# Patient Record
Sex: FEMALE | Race: WHITE | NOT HISPANIC OR LATINO | Employment: UNEMPLOYED | URBAN - METROPOLITAN AREA
[De-identification: names, ages, dates, MRNs, and addresses within clinical notes are randomized per-mention and may not be internally consistent; named-entity substitution may affect disease eponyms.]

---

## 2019-10-25 ENCOUNTER — OFFICE VISIT CONVERTED (OUTPATIENT)
Dept: ORTHOPEDIC SURGERY | Facility: CLINIC | Age: 54
End: 2019-10-25
Attending: ORTHOPAEDIC SURGERY

## 2019-11-04 ENCOUNTER — HOSPITAL ENCOUNTER (OUTPATIENT)
Dept: MRI IMAGING | Facility: HOSPITAL | Age: 54
Discharge: HOME | End: 2019-11-04
Attending: ORTHOPAEDIC SURGERY

## 2019-11-08 ENCOUNTER — OFFICE VISIT CONVERTED (OUTPATIENT)
Dept: ORTHOPEDIC SURGERY | Facility: CLINIC | Age: 54
End: 2019-11-08
Attending: ORTHOPAEDIC SURGERY

## 2019-12-12 ENCOUNTER — OFFICE VISIT CONVERTED (OUTPATIENT)
Dept: ORTHOPEDIC SURGERY | Facility: CLINIC | Age: 54
End: 2019-12-12
Attending: PHYSICIAN ASSISTANT

## 2020-01-10 ENCOUNTER — CONVERSION ENCOUNTER (OUTPATIENT)
Dept: ORTHOPEDIC SURGERY | Facility: CLINIC | Age: 55
End: 2020-01-10

## 2020-01-10 ENCOUNTER — OFFICE VISIT CONVERTED (OUTPATIENT)
Dept: ORTHOPEDIC SURGERY | Facility: CLINIC | Age: 55
End: 2020-01-10
Attending: PHYSICIAN ASSISTANT

## 2020-01-24 ENCOUNTER — HOSPITAL ENCOUNTER (OUTPATIENT)
Dept: OTHER | Facility: HOSPITAL | Age: 55
Discharge: HOME | End: 2020-01-24
Attending: PHYSICIAN ASSISTANT

## 2020-02-04 ENCOUNTER — CONVERSION ENCOUNTER (OUTPATIENT)
Dept: ORTHOPEDIC SURGERY | Facility: CLINIC | Age: 55
End: 2020-02-04

## 2020-02-04 ENCOUNTER — OFFICE VISIT CONVERTED (OUTPATIENT)
Dept: ORTHOPEDIC SURGERY | Facility: CLINIC | Age: 55
End: 2020-02-04
Attending: PHYSICIAN ASSISTANT

## 2020-02-17 ENCOUNTER — HOSPITAL ENCOUNTER (OUTPATIENT)
Dept: OTHER | Facility: HOSPITAL | Age: 55
Discharge: HOME | End: 2020-02-17

## 2020-12-18 ENCOUNTER — OFFICE VISIT CONVERTED (OUTPATIENT)
Dept: SURGERY | Facility: CLINIC | Age: 55
End: 2020-12-18
Attending: SURGERY

## 2021-01-02 ENCOUNTER — HOSPITAL ENCOUNTER (OUTPATIENT)
Dept: PREADMISSION TESTING | Facility: HOSPITAL | Age: 56
Discharge: HOME | End: 2021-01-02
Attending: SURGERY

## 2021-01-04 LAB — SARS-COV-2 RNA SPEC QL NAA+PROBE: NOT DETECTED

## 2021-01-07 ENCOUNTER — HOSPITAL ENCOUNTER (OUTPATIENT)
Dept: PERIOP | Facility: HOSPITAL | Age: 56
Setting detail: HOSPITAL OUTPATIENT SURGERY
Discharge: HOME | End: 2021-01-07
Attending: SURGERY

## 2021-01-25 ENCOUNTER — CONVERSION ENCOUNTER (OUTPATIENT)
Dept: SURGERY | Facility: CLINIC | Age: 56
End: 2021-01-25

## 2021-01-25 ENCOUNTER — OFFICE VISIT CONVERTED (OUTPATIENT)
Dept: SURGERY | Facility: CLINIC | Age: 56
End: 2021-01-25
Attending: SURGERY

## 2021-05-10 NOTE — H&P
History and Physical      Patient Name: Anyi Justice   Patient ID: [id number]   Sex: Female   YOB: 1965    Primary Care Provider: Tanya Lomas MD   Referring Provider: Danielle Snellen APRN    Visit Date: December 18, 2020    Provider: Isma Andersen MD   [place] [facility]   Location Address: [address] [place] [address]  [id number]   Location Phone: [phone]          Chief Complaint  · Outpatient History & Physical / Surgical Orders  · Cholelithiasis      History Of Present Illness     Anyi came in today for evaluation. She is a really nice lady that was referred for symptomatic gallstones. She has been having typical biliary colic episodes and recently had an ultrasound that did show stones. She is otherwise doing fine and is without complaints.       Past Medical History  Allergic rhinitis, chronic; Arthritis         Past Surgical History  Cesarian Section         Medication List  simvastatin 40 mg oral tablet         Allergy List  NO KNOWN DRUG ALLERGIES; NO KNOWN DRUG ALLERGIES       Allergies Reconciled  Family Medical History  Stroke; Diabetes, unspecified type         Social History  Alcohol Use (Current some day); .; lives with other; Recreational Drug Use (Never); Tobacco (Never); Working         Review of Systems  · Constitutional  o Denies  o : fever  · Eyes  o Denies  o : yellowish discoloration of eyes  · HENT  o Denies  o : difficulty swallowing  · Cardiovascular  o Denies  o : chest pain on exertion  · Respiratory  o Denies  o : shortness of breath  · Gastrointestinal  o Admits  o : nausea, vomiting, diarrhea, constipation  · Genitourinary  o Denies  o : abnormal color of urine  · Integument  o Denies  o : rash  · Neurologic  o Denies  o : tingling or numbness  · Musculoskeletal  o Admits  o : joint pain  · Endocrine  o Denies  o : weight gain, weight loss      Vitals  Date Time BP Position Site L\R Cuff Size HR RR TEMP (F) WT  HT   "BMI kg/m2 BSA m2 O2 Sat FR L/min FiO2 HC       12/18/2020 09:49 AM       16  231lbs 0oz 4'  9\" 49.99 2.05             Physical Examination  · Constitutional  o Appearance  o : well-nourished, well developed, alert, in no acute distress  · Head and Face  o Head  o :   § Inspection  § : atraumatic, normocephalic  · Neck  o Inspection/Palpation  o : supple, normal range of motion  · Respiratory  o Inspection of Chest  o : normal inspection  o Auscultation of Lungs  o : breath sounds normal, no distress, clear to ascultate bilaterally  · Cardiovascular  o Heart  o :   § Auscultation of Heart  § : regular rate and rhythm, no murmur, gallop or rub  · Gastrointestinal  o Abdominal Examination  o : normal bowel sounds, non-tender, soft          Assessment  · Pre-Surgical Orders     V72.84  · Calculus of bile duct without cholangitis or biliary obstruction     574.50/K80.50       Very nice lady who has symptomatic gallstones.       Plan  · Orders  o MG Pre-Op Covid-19 Screening (26755) - 574.50/K80.50 - 01/02/2021  o General Surgery Order (GENOR) - 574.50/K80.50 - 01/07/2021  · Medications  o Medications have been Reconciled  o Transition of Care or Provider Policy  · Instructions  o PLAN:  o Handouts Provided-Pre-Procedure Instructions including date and time and location of procedure.  o Surgical Facility: [facility]  o ****Surgical Orders****  o ****Patient Status****  o Outpatient  o RISK AND BENEFITS:  o Consent for surgery: Given these options, the patient has verbally expressed an understanding of the risks of surgery and finds these risks acceptable. We will proceed with surgery as soon as possible.  o Consult Anesthesia for any post-operative block, or any pain management procedure deemed necessary by the anestesiologist for adequate post-operative pain control.   o O.R. PREP: Per protocol  o SCD's preoperatively  o PLEASE SIGN PERMIT FOR: Laparoscopic possible open cholecystectomy  o Indocyanine Green- " 1.25MG IV- On Call To OR  o *___The above History and Physical Examination has been completed within 30 days of admission.  o Electronically Identified Patient Education Materials Provided Electronically     We will set her up for a laparoscopic cholecystectomy. I have described the procedure to her as well as the risks and benefits and she is agreeable to proceeding.             Electronically Signed by: Nisha Ordoñez-, -Author on December 18, 2020 12:50:53 PM  Electronically Co-signed by: Isma Andersen MD -Reviewer on December 19, 2020 09:33:36 AM

## 2021-05-14 VITALS — RESPIRATION RATE: 17 BRPM | BODY MASS INDEX: 49.84 KG/M2 | WEIGHT: 231 LBS | HEIGHT: 57 IN

## 2021-05-14 VITALS — RESPIRATION RATE: 16 BRPM | BODY MASS INDEX: 49.84 KG/M2 | WEIGHT: 231 LBS | HEIGHT: 57 IN

## 2021-05-14 NOTE — PROGRESS NOTES
"   Progress Note      Patient Name: Anyi Justice   Patient ID: [id number]   Sex: Female   YOB: 1965    Primary Care Provider: Tanya Lomas MD   Referring Provider: Danielle Snellen APRN    Visit Date: January 25, 2021    Provider: Isma Andersen MD   [place] [facility]   Location Address: [address] [place] [address]  [id number]   Location Phone: [phone]          Chief Complaint  · Follow Up Office Visit      History Of Present Illness     Anyi came in today for evaluation. She is doing well following laparoscopic cholecystectomy for symptomatic gallstones. She is still a little bit sore on the right side but all in all is doing well.       Past Medical History  Allergic rhinitis, chronic; Arthritis         Past Surgical History  Cesarian Section         Medication List  simvastatin 40 mg oral tablet         Allergy List  NO KNOWN DRUG ALLERGIES; NO KNOWN DRUG ALLERGIES         Family Medical History  Stroke; Diabetes, unspecified type         Social History  Alcohol Use (Current some day); .; lives with other; Recreational Drug Use (Never); Tobacco (Never); Working         Review of Systems  · Cardiovascular  o Denies  o : chest pain, irregular heart beats, rapid heart rate, chest pain on exertion, shortness of breath, lower extremity swelling  · Respiratory  o Denies  o : shortness of breath, wheezing, cough, wheezing, chronic cough, coughing up blood  · Gastrointestinal  o Denies  o : nausea, vomiting, diarrhea, chronic abdominal pain, reflux symptoms      Vitals  Date Time BP Position Site L\R Cuff Size HR RR TEMP (F) WT  HT  BMI kg/m2 BSA m2 O2 Sat FR L/min FiO2 HC       01/25/2021 03:03 PM       17  231lbs 0oz 4'  9\" 49.99 2.05             Physical Examination     Today on physical exam, her incisions look good. Her abdomen is soft.           Assessment  · Postoperative Exam Following Surgery     V67.00       Doing well status post laparoscopic " cholecystectomy.       Plan  · Medications  o Medications have been Reconciled  o Transition of Care or Provider Policy  · Instructions  o Follow up as needed.            Electronically Signed by: Nisha Ordoñez-, -Author on January 25, 2021 04:00:30 PM  Electronically Co-signed by: Isma Andersen MD -Reviewer on January 26, 2021 10:46:54 AM

## 2021-05-15 VITALS — WEIGHT: 221 LBS | HEIGHT: 57 IN | BODY MASS INDEX: 47.68 KG/M2 | HEART RATE: 101 BPM | OXYGEN SATURATION: 99 %

## 2021-05-15 VITALS — HEART RATE: 83 BPM | OXYGEN SATURATION: 96 % | WEIGHT: 233.37 LBS | HEIGHT: 58 IN | BODY MASS INDEX: 48.98 KG/M2

## 2021-05-15 VITALS — OXYGEN SATURATION: 98 % | HEIGHT: 57 IN | WEIGHT: 229 LBS | HEART RATE: 103 BPM | BODY MASS INDEX: 49.4 KG/M2

## 2021-05-15 VITALS — HEIGHT: 58 IN | BODY MASS INDEX: 48.91 KG/M2 | WEIGHT: 233 LBS | HEART RATE: 87 BPM | OXYGEN SATURATION: 99 %

## 2021-05-15 VITALS — HEART RATE: 98 BPM | WEIGHT: 221.5 LBS | BODY MASS INDEX: 47.79 KG/M2 | OXYGEN SATURATION: 98 % | HEIGHT: 57 IN

## 2021-05-20 ENCOUNTER — OFFICE VISIT CONVERTED (OUTPATIENT)
Dept: ORTHOPEDIC SURGERY | Facility: CLINIC | Age: 56
End: 2021-05-20
Attending: PHYSICIAN ASSISTANT

## 2021-05-20 ENCOUNTER — CONVERSION ENCOUNTER (OUTPATIENT)
Dept: ORTHOPEDIC SURGERY | Facility: CLINIC | Age: 56
End: 2021-05-20

## 2021-06-05 NOTE — H&P
History and Physical      Patient Name: Anyi Justice   Patient ID: [id number]   Sex: Female   YOB: 1965    Primary Care Provider: Tanya Lomas MD   Referring Provider: Danielle Snellen APRN    Visit Date: May 20, 2021    Provider: Clau Daley PA-C   [place] [facility]   Location Address: [address] [place] [address]  [id number]   Location Phone: [phone]          Chief Complaint  · Right hip pain       History Of Present Illness  Anyi Justice is a 56 year old /White female who presents today to [facility]. Patient presents today for pain of her right hip. She states she has had pain of her right hip over the past 2 years of her hip, but it recently worsened after a misstep to her apartment on the second floor. She has had x-rays and MRI ordered by her PCP. She presents today with results of her MRI.       Past Medical History  Allergic rhinitis, chronic; Arthritis         Past Surgical History  Cesarian Section; Colonoscopy; Gallbladder; Kidney         Medication List  diclofenac sodium 75 mg oral tablet,delayed release (DR/EC); simvastatin 40 mg oral tablet         Allergy List  NO KNOWN DRUG ALLERGIES; NO KNOWN DRUG ALLERGIES       Allergies Reconciled  Family Medical History  Stroke; Diabetes, unspecified type         Social History  Alcohol Use (Current some day); .; lives with other; Recreational Drug Use (Never); Student.; Tobacco (Never); Working         Review of Systems  · Constitutional  o Denies  o : fever, chills, weight loss  · Cardiovascular  o Denies  o : chest pain, shortness of breath  · Gastrointestinal  o Denies  o : liver disease, heartburn, nausea, blood in stools  · Genitourinary  o Denies  o : painful urination, blood in urine  · Integument  o Denies  o : rash, itching  · Neurologic  o Denies  o : headache, weakness, loss of consciousness  · Musculoskeletal  o Denies  o : painful, swollen  "joints  · Psychiatric  o Denies  o : drug/alcohol addiction, anxiety, depression      Vitals  Date Time BP Position Site L\R Cuff Size HR RR TEMP (F) WT  HT  BMI kg/m2 BSA m2 O2 Sat FR L/min FiO2 HC       05/20/2021 03:28 PM      107 - R   238lbs 0oz 4'  9\" 51.5 2.08 94 %            Physical Examination  · Constitutional  o Appearance  o : well developed, well-nourished, no obvious deformities present  · Head and Face  o Head  o :   § Inspection  § : normocephalic  o Face  o :   § Inspection  § : no facial lesions  · Eyes  o Conjunctivae  o : conjunctivae normal  o Sclerae  o : sclerae white  · Ears, Nose, Mouth and Throat  o Ears  o :   § External Ears  § : appearance within normal limits  § Hearing  § : intact  o Nose  o :   § External Nose  § : appearance normal  · Neck  o Inspection/Palpation  o : normal appearance  o Range of Motion  o : full range of motion  · Respiratory  o Respiratory Effort  o : breathing unlabored  o Inspection of Chest  o : normal appearance  o Auscultation of Lungs  o : no audible wheezing or rales  · Cardiovascular  o Heart  o : regular rate  · Gastrointestinal  o Abdominal Examination  o : soft and non-tender  · Skin and Subcutaneous Tissue  o General Inspection  o : intact, no rashes  · Psychiatric  o General  o : Alert and oriented x3  o Judgement and Insight  o : judgment and insight intact  o Mood and Affect  o : mood normal, affect appropriate  · Right Hip  o Inspection  o : Gait with lurch. Full flexion. Limited IR/ER. Pain with abduction. Positive groin pain with motion. N/v intact. Sensation intact. Calf supple, nontender.   · In Office Procedures  o Xray  o : X -RAY R. HIP 04/07/21 : No traumatic osseous findings or significant changes.   · Imaging  o Imaging  o : MRI 04/25/21 : 1. Study limited secondary to habitus 2. Depression some prominence of the posterior acetabular wall with anterosuperior acetabular cartilage loss and questionable anterior labral tear. Correlate " symptoms with pincer-type ANGELA.          Assessment  · Right Pain: Hip     719.45/M25.559  · Labral tear of hip joint     843.8/S73.199A      Plan  · Medications  o Medications have been Reconciled  o Transition of Care or Provider Policy  · Instructions  o Reviewed the patient's Past Medical, Social, and Family history as well as the ROS at today's visit, no changes.  o Call or return if worsening symptoms.  o X-ray ordered, taken and reviewed at this visit.  o Follow up in 2 months.  o Discussed treatment and diagnosis with patient. We are going to try conservative management with PT and anti-inflammatories and follow up in 2 m.             Electronically Signed by: Clau Daley PA-C -Author on May 20, 2021 04:43:24 PM  Electronically Co-signed by: Justin Matthews MD -Reviewer on May 26, 2021 08:09:14 PM

## 2021-07-15 VITALS — HEIGHT: 57 IN | OXYGEN SATURATION: 94 % | BODY MASS INDEX: 51.34 KG/M2 | HEART RATE: 107 BPM | WEIGHT: 238 LBS

## 2021-11-10 ENCOUNTER — HOSPITAL ENCOUNTER (OUTPATIENT)
Dept: OTHER | Facility: HOSPITAL | Age: 56
Discharge: HOME | End: 2021-11-10

## 2021-12-01 ENCOUNTER — LAB (OUTPATIENT)
Dept: LAB | Facility: HOSPITAL | Age: 56
End: 2021-12-01

## 2021-12-01 ENCOUNTER — TRANSCRIBE ORDERS (OUTPATIENT)
Dept: LAB | Facility: HOSPITAL | Age: 56
End: 2021-12-01

## 2021-12-01 DIAGNOSIS — E61.1 IRON DEFICIENCY: ICD-10-CM

## 2021-12-01 DIAGNOSIS — E03.9 HYPOTHYROIDISM, UNSPECIFIED TYPE: ICD-10-CM

## 2021-12-01 DIAGNOSIS — R29.6 FREQUENT FALLS: ICD-10-CM

## 2021-12-01 DIAGNOSIS — M19.90 ACUTE ARTHRITIS: ICD-10-CM

## 2021-12-01 DIAGNOSIS — E11.9 DIABETES MELLITUS WITHOUT COMPLICATION (HCC): ICD-10-CM

## 2021-12-01 DIAGNOSIS — R32 URINARY INCONTINENCE, UNSPECIFIED TYPE: ICD-10-CM

## 2021-12-01 DIAGNOSIS — E78.5 HYPERLIPIDEMIA, UNSPECIFIED HYPERLIPIDEMIA TYPE: ICD-10-CM

## 2021-12-01 DIAGNOSIS — E11.9 DIABETES MELLITUS WITHOUT COMPLICATION (HCC): Primary | ICD-10-CM

## 2021-12-01 LAB
25(OH)D3 SERPL-MCNC: 21 NG/ML
ALBUMIN SERPL-MCNC: 4.5 G/DL (ref 3.5–5.2)
ALBUMIN/GLOB SERPL: 1.5 G/DL
ALP SERPL-CCNC: 116 U/L (ref 39–117)
ALT SERPL W P-5'-P-CCNC: 15 U/L (ref 1–33)
ANION GAP SERPL CALCULATED.3IONS-SCNC: 9 MMOL/L (ref 5–15)
AST SERPL-CCNC: 14 U/L (ref 1–32)
BASOPHILS # BLD AUTO: 0.04 10*3/MM3 (ref 0–0.2)
BASOPHILS NFR BLD AUTO: 0.4 % (ref 0–1.5)
BILIRUB SERPL-MCNC: 0.4 MG/DL (ref 0–1.2)
BUN SERPL-MCNC: 15 MG/DL (ref 6–20)
BUN/CREAT SERPL: 21.7 (ref 7–25)
CALCIUM SPEC-SCNC: 9.6 MG/DL (ref 8.6–10.5)
CEA SERPL-MCNC: 0.61 NG/ML
CHLORIDE SERPL-SCNC: 100 MMOL/L (ref 98–107)
CO2 SERPL-SCNC: 26 MMOL/L (ref 22–29)
CREAT SERPL-MCNC: 0.69 MG/DL (ref 0.57–1)
DEPRECATED RDW RBC AUTO: 40.4 FL (ref 37–54)
EOSINOPHIL # BLD AUTO: 0.12 10*3/MM3 (ref 0–0.4)
EOSINOPHIL NFR BLD AUTO: 1.1 % (ref 0.3–6.2)
ERYTHROCYTE [DISTWIDTH] IN BLOOD BY AUTOMATED COUNT: 13.7 % (ref 12.3–15.4)
FERRITIN SERPL-MCNC: 105 NG/ML (ref 13–150)
FOLATE SERPL-MCNC: 12.4 NG/ML (ref 4.78–24.2)
GFR SERPL CREATININE-BSD FRML MDRD: 88 ML/MIN/1.73
GLOBULIN UR ELPH-MCNC: 3.1 GM/DL
GLUCOSE SERPL-MCNC: 116 MG/DL (ref 65–99)
HCT VFR BLD AUTO: 38.6 % (ref 34–46.6)
HGB BLD-MCNC: 12.6 G/DL (ref 12–15.9)
IMM GRANULOCYTES # BLD AUTO: 0.08 10*3/MM3 (ref 0–0.05)
IMM GRANULOCYTES NFR BLD AUTO: 0.7 % (ref 0–0.5)
IRON 24H UR-MRATE: 43 MCG/DL (ref 37–145)
IRON SATN MFR SERPL: 10 % (ref 20–50)
LYMPHOCYTES # BLD AUTO: 3.9 10*3/MM3 (ref 0.7–3.1)
LYMPHOCYTES NFR BLD AUTO: 36.1 % (ref 19.6–45.3)
MCH RBC QN AUTO: 26.9 PG (ref 26.6–33)
MCHC RBC AUTO-ENTMCNC: 32.6 G/DL (ref 31.5–35.7)
MCV RBC AUTO: 82.5 FL (ref 79–97)
MONOCYTES # BLD AUTO: 0.62 10*3/MM3 (ref 0.1–0.9)
MONOCYTES NFR BLD AUTO: 5.7 % (ref 5–12)
NEUTROPHILS NFR BLD AUTO: 56 % (ref 42.7–76)
NEUTROPHILS NFR BLD AUTO: 6.05 10*3/MM3 (ref 1.7–7)
NRBC BLD AUTO-RTO: 0 /100 WBC (ref 0–0.2)
PLATELET # BLD AUTO: 341 10*3/MM3 (ref 140–450)
PMV BLD AUTO: 11.5 FL (ref 6–12)
POTASSIUM SERPL-SCNC: 4 MMOL/L (ref 3.5–5.2)
PROT SERPL-MCNC: 7.6 G/DL (ref 6–8.5)
RBC # BLD AUTO: 4.68 10*6/MM3 (ref 3.77–5.28)
SODIUM SERPL-SCNC: 135 MMOL/L (ref 136–145)
T4 FREE SERPL-MCNC: 0.82 NG/DL (ref 0.93–1.7)
TIBC SERPL-MCNC: 429 MCG/DL (ref 298–536)
TRANSFERRIN SERPL-MCNC: 288 MG/DL (ref 200–360)
TSH SERPL DL<=0.05 MIU/L-ACNC: 2.92 UIU/ML (ref 0.27–4.2)
VIT B12 BLD-MCNC: 468 PG/ML (ref 211–946)
WBC NRBC COR # BLD: 10.81 10*3/MM3 (ref 3.4–10.8)

## 2021-12-01 PROCEDURE — 83825 ASSAY OF MERCURY: CPT

## 2021-12-01 PROCEDURE — 84443 ASSAY THYROID STIM HORMONE: CPT

## 2021-12-01 PROCEDURE — 83655 ASSAY OF LEAD: CPT

## 2021-12-01 PROCEDURE — 82607 VITAMIN B-12: CPT

## 2021-12-01 PROCEDURE — 84439 ASSAY OF FREE THYROXINE: CPT

## 2021-12-01 PROCEDURE — 82175 ASSAY OF ARSENIC: CPT

## 2021-12-01 PROCEDURE — 82306 VITAMIN D 25 HYDROXY: CPT

## 2021-12-01 PROCEDURE — 80053 COMPREHEN METABOLIC PANEL: CPT

## 2021-12-01 PROCEDURE — 83540 ASSAY OF IRON: CPT

## 2021-12-01 PROCEDURE — 86038 ANTINUCLEAR ANTIBODIES: CPT

## 2021-12-01 PROCEDURE — 84466 ASSAY OF TRANSFERRIN: CPT

## 2021-12-01 PROCEDURE — 82378 CARCINOEMBRYONIC ANTIGEN: CPT

## 2021-12-01 PROCEDURE — 82728 ASSAY OF FERRITIN: CPT

## 2021-12-01 PROCEDURE — 82746 ASSAY OF FOLIC ACID SERUM: CPT

## 2021-12-01 PROCEDURE — 85025 COMPLETE CBC W/AUTO DIFF WBC: CPT

## 2021-12-01 PROCEDURE — 82300 ASSAY OF CADMIUM: CPT

## 2021-12-01 PROCEDURE — 86618 LYME DISEASE ANTIBODY: CPT

## 2021-12-02 LAB — B BURGDOR IGG+IGM SER-ACNC: <0.91 ISR (ref 0–0.9)

## 2021-12-04 LAB
ANA TITR SER IF: NEGATIVE {TITER}
ARSENIC BLD-MCNC: 2 UG/L (ref 2–23)
CADMIUM BLD-MCNC: <0.5 UG/L (ref 0–1.2)
LEAD BLDV-MCNC: <1 UG/DL (ref 0–4)
MERCURY BLD-MCNC: <1 UG/L (ref 0–14.9)

## 2021-12-15 ENCOUNTER — TRANSCRIBE ORDERS (OUTPATIENT)
Dept: LAB | Facility: HOSPITAL | Age: 56
End: 2021-12-15

## 2021-12-15 ENCOUNTER — LAB (OUTPATIENT)
Dept: LAB | Facility: HOSPITAL | Age: 56
End: 2021-12-15

## 2021-12-15 DIAGNOSIS — G95.9 CERVICAL MYELOPATHY (HCC): ICD-10-CM

## 2021-12-15 DIAGNOSIS — E11.9 DIABETES MELLITUS WITHOUT COMPLICATION (HCC): Primary | ICD-10-CM

## 2021-12-15 DIAGNOSIS — E11.9 DIABETES MELLITUS WITHOUT COMPLICATION (HCC): ICD-10-CM

## 2021-12-15 LAB
GLUCOSE BLD-MCNC: 161 MG/DL (ref 74–155)
GLUCOSE P FAST SERPL-MCNC: 119 MG/DL (ref 74–106)
HBA1C MFR BLD: 6.75 % (ref 4.8–5.6)

## 2021-12-15 PROCEDURE — 36415 COLL VENOUS BLD VENIPUNCTURE: CPT

## 2021-12-15 PROCEDURE — 83036 HEMOGLOBIN GLYCOSYLATED A1C: CPT

## 2021-12-15 PROCEDURE — 82947 ASSAY GLUCOSE BLOOD QUANT: CPT

## 2022-03-10 ENCOUNTER — OFFICE VISIT (OUTPATIENT)
Dept: NEUROSURGERY | Facility: CLINIC | Age: 57
End: 2022-03-10

## 2022-03-10 VITALS
DIASTOLIC BLOOD PRESSURE: 77 MMHG | SYSTOLIC BLOOD PRESSURE: 143 MMHG | HEIGHT: 57 IN | WEIGHT: 228.7 LBS | BODY MASS INDEX: 49.34 KG/M2

## 2022-03-10 DIAGNOSIS — G89.29 CHRONIC MIDLINE LOW BACK PAIN WITH RIGHT-SIDED SCIATICA: Primary | ICD-10-CM

## 2022-03-10 DIAGNOSIS — M54.41 CHRONIC MIDLINE LOW BACK PAIN WITH RIGHT-SIDED SCIATICA: Primary | ICD-10-CM

## 2022-03-10 DIAGNOSIS — M51.16 LUMBAR DISC HERNIATION WITH RADICULOPATHY: ICD-10-CM

## 2022-03-10 PROCEDURE — 99204 OFFICE O/P NEW MOD 45 MIN: CPT | Performed by: NEUROLOGICAL SURGERY

## 2022-03-10 RX ORDER — ESCITALOPRAM OXALATE 10 MG/1
TABLET ORAL
COMMUNITY
Start: 2022-03-08

## 2022-03-10 RX ORDER — SIMVASTATIN 40 MG
TABLET ORAL
COMMUNITY
Start: 2022-03-08

## 2022-03-10 RX ORDER — FENOFIBRIC ACID 135 MG/1
CAPSULE, DELAYED RELEASE ORAL
COMMUNITY
Start: 2022-03-08

## 2022-03-10 RX ORDER — BUSPIRONE HYDROCHLORIDE 10 MG/1
TABLET ORAL
COMMUNITY
Start: 2022-01-21

## 2022-03-10 NOTE — PROGRESS NOTES
"Chief Complaint  Back Pain    Subjective          Anyi Justice who is a 56 y.o. year old female who presents to [facility] for Evaluation of the Spine.     The patient complains of pain located in the lumbar spine.  Patients states the pain has been present for years, worse over last year.  The pain came on gradually.  The pain scale level is up to 8-9/10.  The pain radiates to the right leg. The back pain is equal to the leg pain.  The pain is waxing/waning and described as dull, sharp, aching, and throbbing.  The pain is worse at no particular time of day. Patient states standing makes the pain worse. Patient states movement (to a degree) makes the pain better.      Associated Symptoms Include: Loss of Bowel and Bladder Control  Conservative Interventions Include: Chiropractor-helps for a day or two    Was this the result of an injury or accident?: No    History of Previous Spinal Surgery?: No    She reports that she has never smoked. She has never used smokeless tobacco.    Review of Systems   Gastrointestinal: Positive for abdominal pain.   Musculoskeletal: Positive for back pain and myalgias.        Objective   Vital Signs:   /77 (BP Location: Right arm, Patient Position: Sitting)   Ht 144.8 cm (57\")   Wt 104 kg (228 lb 11.2 oz)   BMI 49.49 kg/m²       Physical Exam  Constitutional:       Comments: BMI 49   Cardiovascular:      Comments: No edema  Pulmonary:      Effort: Pulmonary effort is normal.   Musculoskeletal:      Comments: Pain with rotation of the right hip  SLR-   Neurological:      Mental Status: She is alert.      Sensory: No sensory deficit.      Motor: No weakness.      Deep Tendon Reflexes: Reflexes normal.   Psychiatric:         Mood and Affect: Mood normal.          Result Review :   I personally reviewed the patient's MRI scan which shows a right L4-5 foraminal disc with some compression of L4 nerve root.        Assessment and Plan  "   Diagnoses and all orders for this visit:    1. Chronic midline low back pain with right-sided sciatica (Primary)    2. Lumbar disc herniation with radiculopathy  Comments:  L4-5 far lateral    She doesn't have pain in the L4 distribution, but will monitor for this. If this develops, she could consider a right L4-5 far lateral discectomy.    She will work on core strengthening exercises and weight loss.     Follow Up   Return if symptoms worsen or fail to improve.  Patient was given instructions and counseling regarding her condition or for health maintenance advice. Please see specific information pulled into the AVS if appropriate.

## 2022-08-19 ENCOUNTER — TELEPHONE (OUTPATIENT)
Dept: UROLOGY | Facility: CLINIC | Age: 57
End: 2022-08-19

## 2022-08-19 ENCOUNTER — OFFICE VISIT (OUTPATIENT)
Dept: UROLOGY | Facility: CLINIC | Age: 57
End: 2022-08-19

## 2022-08-19 VITALS — WEIGHT: 223.6 LBS | RESPIRATION RATE: 20 BRPM | BODY MASS INDEX: 50.3 KG/M2 | HEIGHT: 56 IN

## 2022-08-19 DIAGNOSIS — N39.3 STRESS INCONTINENCE: Primary | ICD-10-CM

## 2022-08-19 DIAGNOSIS — R35.0 URINARY FREQUENCY: ICD-10-CM

## 2022-08-19 DIAGNOSIS — N81.10 INCOMPLETE PROLAPSE OF ANTERIOR WALL OF VAGINA: ICD-10-CM

## 2022-08-19 PROBLEM — S83.209A TEAR OF MENISCUS OF KNEE: Status: ACTIVE | Noted: 2019-10-01

## 2022-08-19 PROBLEM — M19.90 OSTEOARTHRITIS: Status: ACTIVE | Noted: 2020-02-17

## 2022-08-19 LAB
BILIRUB BLD-MCNC: NEGATIVE MG/DL
CLARITY, POC: CLEAR
COLOR UR: YELLOW
EXPIRATION DATE: ABNORMAL
GLUCOSE UR STRIP-MCNC: NEGATIVE MG/DL
KETONES UR QL: NEGATIVE
LEUKOCYTE EST, POC: NEGATIVE
Lab: ABNORMAL
NITRITE UR-MCNC: NEGATIVE MG/ML
PH UR: 6 [PH] (ref 5–8)
PROT UR STRIP-MCNC: ABNORMAL MG/DL
RBC # UR STRIP: ABNORMAL /UL
SP GR UR: 1.02 (ref 1–1.03)
URINE VOLUME: 0
UROBILINOGEN UR QL: NORMAL

## 2022-08-19 PROCEDURE — 99213 OFFICE O/P EST LOW 20 MIN: CPT | Performed by: NURSE PRACTITIONER

## 2022-08-19 PROCEDURE — 51798 US URINE CAPACITY MEASURE: CPT | Performed by: NURSE PRACTITIONER

## 2022-08-19 RX ORDER — MELOXICAM 7.5 MG/1
7.5 TABLET ORAL DAILY
COMMUNITY
Start: 2022-05-14

## 2022-08-19 RX ORDER — HYDROXYZINE HYDROCHLORIDE 10 MG/1
10 TABLET, FILM COATED ORAL 3 TIMES DAILY
COMMUNITY
Start: 2022-05-21

## 2022-08-19 RX ORDER — CETIRIZINE HYDROCHLORIDE 10 MG/1
TABLET ORAL
COMMUNITY
Start: 2022-08-16

## 2022-08-19 NOTE — TELEPHONE ENCOUNTER
OM for pt to return our call.  Pt is scheduled with Dr edel Torrez 10/31/22 at 1:00pm.  They will be mailing pt new packet info with all information.

## 2022-08-19 NOTE — PROGRESS NOTES
"Chief Complaint: Urinary Incontinence (Patient states sometimes she just urinates at unexpected times.)    Subjective         History of Present Illness  Anyi Justice is a 57 y.o. female presents to [facility] to be seen for urinary incontinence.    The patient states she has had \"severe urinary incontinence\" for over a year.     She states she can void then some time later will have large amounts of leakage.     She has not had a hysterectomy.     She states she \"feels like everything is gonna fall out\"    She states that she cannot work related to this.     She reports fatigue as well.    She has not had any treatment for this.    She leaks with coughing, laughing, sneezing, a \"bump in the road\" bending, stooping.    She has not had nocturnal enuresis.     Nocturia x 1-3    She drinks \"not enough\" water 3-4 bottles a day.     She states if she eats \"pasta cooked in water\" \"it is bad for my system\"    The patient states that this all began after she fell off of a porch back in 2019 and believes that her injury to her hip leg and lower back may be contributing to this.    The patient brings in records from the last several years and she has had extensive radiologic evaluation including CT scans and MRIs of the brain C-spine T-spine and L-spine but no imaging of her sacral area.    She is also had a pelvic ultrasound related to chronic right adnexal pain.  Her imaging from 11/12/2020 reveals normal-sized uterus containing a 16mm fibroid in the anterior body.  The patient states that she was unaware of this finding.                Objective     Past Medical History:   Diagnosis Date   • Arthritis    • Brain concussion numerous throughout lifetime- horseback riding, skateboards, bicycles, fall off parked truck, fell in bathtub as a child.   • Chronic allergic rhinitis    • Diabetes mellitus (HCC) recently diagnosed also on meds for this too   • Headache mostly behind left ear and right side " "of base of skull   • Hyperlipidemia now on medication for this   • Low back pain very long time/most significant since 2019   • Lumbosacral disc disease since  most problematic   • Peripheral neuropathy        Past Surgical History:   Procedure Laterality Date   •  SECTION     • COLONOSCOPY     • EPIDURAL BLOCK  1994  when my amazing son was born.   • GALLBLADDER SURGERY     • KIDNEY SURGERY           Current Outpatient Medications:   •  busPIRone (BUSPAR) 10 MG tablet, take 1/2 to 1 whole TABLET BY MOUTH three times DAILY AS NEEDED, Disp: , Rfl:   •  cetirizine (zyrTEC) 10 MG tablet, , Disp: , Rfl:   •  diclofenac (VOLTAREN) 50 MG EC tablet, , Disp: , Rfl:   •  escitalopram (LEXAPRO) 10 MG tablet, , Disp: , Rfl:   •  fenofibric acid (TRILIPIX) 135 MG capsule delayed-release delayed release capsule, , Disp: , Rfl:   •  hydrOXYzine (ATARAX) 10 MG tablet, Take 10 mg by mouth 3 (Three) Times a Day., Disp: , Rfl:   •  meloxicam (MOBIC) 7.5 MG tablet, Take 7.5 mg by mouth Daily., Disp: , Rfl:   •  simvastatin (ZOCOR) 40 MG tablet, , Disp: , Rfl:     No Known Allergies     Family History   Problem Relation Age of Onset   • Stroke Mother    • Diabetes Mother    • Hearing loss Mother         she wore hearing aids   • Miscarriages / Stillbirths Mother    • Vision loss Mother         Macular Degeneration   • Other Mother         short - under 5 ' - 4'9\"   • Stroke Brother    • Asthma Brother    • Alcohol abuse Father         functional alcoholic/ life of the party aneta   • Other Father    • Cancer Sister         our mothers were identical twins, Vonda is battling stage 4 Metastatic Breast Cancer and is a couple years older than I am   • Depression Brother         brother has had bouts of depression.       Social History     Socioeconomic History   • Marital status:    Tobacco Use   • Smoking status: Never Smoker   • Smokeless tobacco: Never Used   Vaping Use   • Vaping Use: Never used " "  Substance and Sexual Activity   • Alcohol use: Not Currently     Comment: [employer] season is over for the year. LOL   • Drug use: Never   • Sexual activity: Not Currently     Partners: Male     Birth control/protection: Condom       Vital Signs:   Resp 20   Ht 142.2 cm (56\")   Wt 101 kg (223 lb 9.6 oz)   BMI 50.13 kg/m²      Physical Exam  Genitourinary:     Comments: Cystourethrocele noted on exam as well as anterior vaginal vault laxity.           Result Review :   The following data was reviewed by: RAJAN Kent on 08/19/2022:  Results for orders placed or performed in visit on 08/19/22   Bladder Scan   Result Value Ref Range    Urine Volume 0    POC Urinalysis Dipstick, Automated    Specimen: Urine   Result Value Ref Range    Color Yellow Yellow, Straw, Dark Yellow, Radha    Clarity, UA Clear Clear    Specific Gravity  1.025 1.005 - 1.030    pH, Urine 6.0 5.0 - 8.0    Leukocytes Negative Negative    Nitrite, UA Negative Negative    Protein, POC Trace (A) Negative mg/dL    Glucose, UA Negative Negative mg/dL    Ketones, UA Negative Negative    Urobilinogen, UA Normal Normal    Bilirubin Negative Negative    Blood, UA Trace (A) Negative    Lot Number [id number]     Expiration Date 8/23        Bladder Scan interpretation 08/19/2022    Estimation of residual urine via [employer] 3000 [employer] Registry Bladder Scan  MA/nurse performing: Sandie epps RN   Residual Urine: 0 ml  Indication: Stress incontinence    Urinary frequency    Incomplete prolapse of anterior wall of vagina   Position: Supine  Examination: Incremental scanning of the suprapubic area using 2.0 MHz transducer using copious amounts of acoustic gel.   Findings: An anechoic area was demonstrated which represented the bladder, with measurement of residual urine as noted. I inspected this myself. In that the residual urine was insignificant, refer to plan for treatment and plan necessary at this time.           Procedures        Assessment and Plan  "   Diagnoses and all orders for this visit:    1. Stress incontinence (Primary)  -     Bladder Scan  -     POC Urinalysis Dipstick, Automated  -     Cystometrogram; Future  -     Ambulatory Referral to Gynecologic Urology    2. Urinary frequency  -     Cystometrogram; Future  -     Ambulatory Referral to Gynecologic Urology    3. Incomplete prolapse of anterior wall of vagina  -     Ambulatory Referral to Gynecologic Urology    Discussed with patient at this point in time given her exam findings I would like to get her referred over to urogynecology for further evaluation and treatment.    We will order a urodynamics test to further evaluate prior to urogynecology appointment.    I spent 15 minutes caring for Anyi on this date of service. This time includes time spent by me in the following activities:reviewing tests, obtaining and/or reviewing a separately obtained history, performing a medically appropriate examination and/or evaluation , counseling and educating the patient/family/caregiver, ordering medications, tests, or procedures, and documenting information in the medical record  Follow Up   Return for UDS.  Patient was given instructions and counseling regarding her condition or for health maintenance advice. Please see specific information pulled into the AVS if appropriate.         This document has been electronically signed by RAJAN Kent  August 19, 2022 09:17 EDT

## 2022-09-22 ENCOUNTER — TELEPHONE (OUTPATIENT)
Dept: NEUROSURGERY | Facility: CLINIC | Age: 57
End: 2022-09-22

## 2022-09-22 NOTE — TELEPHONE ENCOUNTER
Caller: [facility]    Relationship:  OFFICE    Best call back number:[phone]    What form or medical record are you requesting:LAST OFFICE NOTE    Who is requesting this form or medical record from you: OFFICE    How would you like to receive the form or medical records (pick-up, mail, fax):FAX  If fax, what is the fax number: [phone]  If mail, what is the address:   If pick-up, provide patient with address and location details    Timeframe paperwork needed:ASAP    Additional notes:[facility] FROM  IS CALLING AND REQUESTING LAST OFFICE NOTE FROM  TO BE SENT TO THEM AT FAX NUMBER ABOVE THANK YOU

## 2022-10-27 ENCOUNTER — PROCEDURE VISIT (OUTPATIENT)
Dept: UROLOGY | Facility: CLINIC | Age: 57
End: 2022-10-27

## 2022-10-27 DIAGNOSIS — R35.0 URINARY FREQUENCY: ICD-10-CM

## 2022-10-27 DIAGNOSIS — N39.3 STRESS INCONTINENCE: ICD-10-CM

## 2022-10-27 DIAGNOSIS — N39.41 URGE INCONTINENCE: Primary | ICD-10-CM

## 2022-10-27 PROCEDURE — 51728 CYSTOMETROGRAM W/VP: CPT | Performed by: UROLOGY

## 2022-10-27 PROCEDURE — 51741 ELECTRO-UROFLOWMETRY FIRST: CPT | Performed by: UROLOGY

## 2022-10-27 PROCEDURE — 51797 INTRAABDOMINAL PRESSURE TEST: CPT | Performed by: UROLOGY

## 2022-10-27 PROCEDURE — 51784 ANAL/URINARY MUSCLE STUDY: CPT | Performed by: UROLOGY

## 2022-11-29 ENCOUNTER — TELEPHONE (OUTPATIENT)
Dept: NEUROSURGERY | Facility: CLINIC | Age: 57
End: 2022-11-29

## 2022-11-29 NOTE — TELEPHONE ENCOUNTER
Caller: JOANNA BELLA    Relationship: SELF    Best call back number: [phone]    What was the call regarding: PT CALLED TO MAKE AN APPT WITH DR. MOMIN.  PT STATES THAT HER INCONT IS GETTING WORSE AND SHE HAS FOLLOWED UP WITH UROLOGY AND THEY STATE THIS IS ALL RELATED TO HER BACK AND THAT SHE SHOULD HAVE THIS RESOLVED FIRST.  PT STATES DR. MOMIN WAS AWARE OF HER URINE INCONT BUT IT HAS GOTTEN WORSE.    Do you require a callback:     PLEASE CALL PT  THANK YOU

## 2022-12-06 ENCOUNTER — OFFICE VISIT (OUTPATIENT)
Dept: NEUROSURGERY | Facility: CLINIC | Age: 57
End: 2022-12-06

## 2022-12-06 VITALS
HEIGHT: 56 IN | DIASTOLIC BLOOD PRESSURE: 65 MMHG | SYSTOLIC BLOOD PRESSURE: 137 MMHG | WEIGHT: 229.8 LBS | BODY MASS INDEX: 51.7 KG/M2

## 2022-12-06 DIAGNOSIS — M51.16 LUMBAR DISC HERNIATION WITH RADICULOPATHY: ICD-10-CM

## 2022-12-06 DIAGNOSIS — G89.29 CHRONIC MIDLINE LOW BACK PAIN WITH RIGHT-SIDED SCIATICA: Primary | ICD-10-CM

## 2022-12-06 DIAGNOSIS — M54.41 CHRONIC MIDLINE LOW BACK PAIN WITH RIGHT-SIDED SCIATICA: Primary | ICD-10-CM

## 2022-12-06 PROCEDURE — 99213 OFFICE O/P EST LOW 20 MIN: CPT | Performed by: NEUROLOGICAL SURGERY

## 2022-12-06 NOTE — PROGRESS NOTES
Anyi Justice is a 57 y.o. female that presents with Back Pain       She has lost weight. She has incontinence which has been present for about 2 years. She has a right L4-5 far lateral disc herniation on her MRI from a year ago. The worst pain is in the back and the hip. The pain worsens with walking and turning. There are times when the ankle hurts. The pain doesn't make it to the toes. It is in the groin area and the thigh mostly.       Review of Systems   Musculoskeletal: Positive for back pain and myalgias.            Physical Exam  Constitutional:       Comments: BMI 51   Cardiovascular:      Comments: No edema  Pulmonary:      Effort: Pulmonary effort is normal.   Musculoskeletal:      Comments: Pain with rotation of the right hip   Neurological:      Mental Status: She is alert.      Motor: No weakness.   Psychiatric:         Mood and Affect: Mood normal.            Assessment and Plan {CC Problem List  Visit Diagnosis  ROS  Review (Popup)  Health Maintenance  Quality  BestPractice  Medications  SmartSets  SnapShot Encounters  Media :23}   Problem List Items Addressed This Visit    None  Visit Diagnoses     Chronic midline low back pain with right-sided sciatica    -  Primary    Lumbar disc herniation with radiculopathy        Right L4-5 far lateral        Surgery for the far lateral disc would not help the incontinence.     We will repeat the MRI to evaluate the worsened leg pain and incontinence.    Follow Up {Instructions Charge Capture  Follow-up Communications :23}   No follow-ups on file.

## 2022-12-15 ENCOUNTER — HOSPITAL ENCOUNTER (OUTPATIENT)
Dept: MRI IMAGING | Facility: HOSPITAL | Age: 57
Discharge: HOME | End: 2022-12-15
Admitting: NEUROLOGICAL SURGERY

## 2022-12-15 DIAGNOSIS — M51.16 LUMBAR DISC HERNIATION WITH RADICULOPATHY: ICD-10-CM

## 2022-12-15 DIAGNOSIS — M54.41 CHRONIC MIDLINE LOW BACK PAIN WITH RIGHT-SIDED SCIATICA: ICD-10-CM

## 2022-12-15 DIAGNOSIS — G89.29 CHRONIC MIDLINE LOW BACK PAIN WITH RIGHT-SIDED SCIATICA: ICD-10-CM

## 2022-12-15 PROCEDURE — 72148 MRI LUMBAR SPINE W/O DYE: CPT

## 2023-06-01 ENCOUNTER — TRANSCRIBE ORDERS (OUTPATIENT)
Dept: LAB | Facility: HOSPITAL | Age: 58
End: 2023-06-01
Payer: COMMERCIAL

## 2023-06-01 ENCOUNTER — LAB (OUTPATIENT)
Dept: LAB | Facility: HOSPITAL | Age: 58
End: 2023-06-01
Payer: COMMERCIAL

## 2023-06-01 DIAGNOSIS — S80.869A INSECT BITE, NONVENOMOUS OF HIP, THIGH, LEG, AND ANKLE, INFECTED: ICD-10-CM

## 2023-06-01 DIAGNOSIS — L08.9 INSECT BITE, NONVENOMOUS OF HIP, THIGH, LEG, AND ANKLE, INFECTED: ICD-10-CM

## 2023-06-01 DIAGNOSIS — S90.569A INSECT BITE, NONVENOMOUS OF HIP, THIGH, LEG, AND ANKLE, INFECTED: ICD-10-CM

## 2023-06-01 DIAGNOSIS — S20.369A: ICD-10-CM
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
## 2023-06-01 DIAGNOSIS — S70.369A INSECT BITE, NONVENOMOUS OF HIP, THIGH, LEG, AND ANKLE, INFECTED: ICD-10-CM
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
## 2023-06-01 DIAGNOSIS — T78.3XXA GIANT URTICARIA, INITIAL ENCOUNTER: Primary | ICD-10-CM

## 2023-06-01 DIAGNOSIS — S70.269A INSECT BITE, NONVENOMOUS OF HIP, THIGH, LEG, AND ANKLE, INFECTED: ICD-10-CM

## 2023-06-01 DIAGNOSIS — T78.3XXA GIANT URTICARIA, INITIAL ENCOUNTER: ICD-10-CM

## 2023-06-01 PROCEDURE — 86003 ALLG SPEC IGE CRUDE XTRC EA: CPT

## 2023-06-01 PROCEDURE — 36415 COLL VENOUS BLD VENIPUNCTURE: CPT

## 2023-06-01 PROCEDURE — 82785 ASSAY OF IGE: CPT

## 2023-06-01 PROCEDURE — 86008 ALLG SPEC IGE RECOMB EA: CPT

## 2023-06-08 LAB
ALPHA-GAL IGE QN: 5.25 KU/L
BEEF IGE QN: 1.48 KU/L
CONV CLASS DESCRIPTION: ABNORMAL
IGE SERPL-ACNC: 142 IU/ML (ref 6–495)
LAMB IGE QN: 0.72 KU/L
PORK IGE QN: 0.5 KU/L

## 2023-06-19 ENCOUNTER — OFFICE VISIT (OUTPATIENT)
Dept: NEUROLOGY | Facility: CLINIC | Age: 58
End: 2023-06-19
Payer: COMMERCIAL

## 2023-06-19 VITALS
HEART RATE: 97 BPM | WEIGHT: 218 LBS | SYSTOLIC BLOOD PRESSURE: 139 MMHG | BODY MASS INDEX: 49.04 KG/M2 | DIASTOLIC BLOOD PRESSURE: 62 MMHG | HEIGHT: 56 IN

## 2023-06-19 DIAGNOSIS — G47.33 OBSTRUCTIVE SLEEP APNEA: Primary | ICD-10-CM

## 2023-06-19 PROBLEM — R26.9 GAIT ABNORMALITY: Status: ACTIVE | Noted: 2023-06-19

## 2023-06-19 PROCEDURE — 99203 OFFICE O/P NEW LOW 30 MIN: CPT | Performed by: PSYCHIATRY & NEUROLOGY

## 2023-06-19 RX ORDER — BACLOFEN 10 MG/1
10 TABLET ORAL 3 TIMES DAILY
COMMUNITY

## 2023-06-19 RX ORDER — GABAPENTIN 300 MG/1
300 CAPSULE ORAL 3 TIMES DAILY
COMMUNITY

## 2023-06-19 NOTE — PROGRESS NOTES
"Chief Complaint  Dizziness (Previous Dr. Sanchez patient, transfer of care. ) and Gait Problem    Subjective          Anyi Justice is a 58 y.o. female who presents to [facility] NEUROLOGY & [facility]OSURGERY  History of Present Illness  58-year-old woman evaluated for gait abnormalities.  She states that it comes and goes.  At least 5 to 6 days out of the week she feels unsteady.  She feels like she has difficulty remembering things and is sleepy.  She has been worked up by another neurologist 2 years ago including MRI of the brain, cervical spine, thoracic spine and laboratory work-up which has been unremarkable.  She is never had a sleep study.  She states that she has been and sleepy for years.  Is gotten worse in the last 2 years.  She states that she lives by herself and when she wakes up in the morning she feels tired and sleepy and has problems.  All she wants to do is sleep most of the time.  States that her gait is that her body wants to go forward but her legs stay behind her.    She states that she has had concussions in the past.  She also has urinary incontinence.  She is being followed by neurosurgery.  Her work-up in the past has been reviewed which is unremarkable.    Objective   Vital Signs:   /62 (BP Location: Right arm, Patient Position: Sitting, Cuff Size: Adult)   Pulse 97   Ht 142.2 cm (55.98\")   Wt 98.9 kg (218 lb)   BMI 48.90 kg/m²     Physical Exam   She is alert, fluent, phasic, follows commands well.  Visual fields are full, EMs follow directions gaze, facial strength is full, soft palate elevation and tongue are normal.  There is no weakness of the upper or lower extremities.  Sensation is symmetrical to pinprick and vibration.  Romberg is negative.  Propulsion retropulsion is negative.  Station and gait she is able to tiptoe, heel walk, osiris and tandem without abnormality.  She is using a cane to balance herself.  He can walk without the cane as noted above.      "   Assessment and Plan  Diagnoses and all orders for this visit:    1. Obstructive sleep apnea (Primary)  Assessment & Plan:  Discussed with her that work-up in the past has been negative for primary brain abnormalities and her symptoms may be secondary to obstructive sleep apnea in which she has excessive daytime somnolence, memory problems, fatigue and gait abnormalities.  I discussed with her that since the work-up has been negative in the past that we will see if her gait abnormalities improve with treating her sleep apnea syndrome which is most likely the diagnosis.    Orders:  -     Ambulatory Referral to Sleep Medicine         Total time spent with the patient and coordinating patient care was 30 minutes.    Follow Up  No follow-ups on file.  Patient was given instructions and counseling regarding her condition or for health maintenance advice. Please see specific information pulled into the AVS if appropriate.

## 2023-06-19 NOTE — ASSESSMENT & PLAN NOTE
Discussed with her that work-up in the past has been negative for primary brain abnormalities and her symptoms may be secondary to obstructive sleep apnea in which she has excessive daytime somnolence, memory problems, fatigue and gait abnormalities.  I discussed with her that since the work-up has been negative in the past that we will see if her gait abnormalities improve with treating her sleep apnea syndrome which is most likely the diagnosis.

## 2023-10-16 ENCOUNTER — OFFICE VISIT (OUTPATIENT)
Dept: OBSTETRICS AND GYNECOLOGY | Facility: CLINIC | Age: 58
End: 2023-10-16
Payer: COMMERCIAL

## 2023-10-16 VITALS
BODY MASS INDEX: 45.89 KG/M2 | SYSTOLIC BLOOD PRESSURE: 126 MMHG | DIASTOLIC BLOOD PRESSURE: 81 MMHG | WEIGHT: 204 LBS | HEART RATE: 101 BPM | HEIGHT: 56 IN

## 2023-10-16 DIAGNOSIS — Z01.419 WELL WOMAN EXAM: Primary | ICD-10-CM

## 2023-10-16 DIAGNOSIS — R15.9 FULL INCONTINENCE OF FECES: ICD-10-CM

## 2023-10-16 DIAGNOSIS — N39.46 URINARY INCONTINENCE, MIXED: ICD-10-CM

## 2023-10-16 DIAGNOSIS — E66.01 MORBID OBESITY WITH BMI OF 45.0-49.9, ADULT: ICD-10-CM

## 2023-10-16 PROBLEM — M51.369 DEGENERATION OF LUMBAR INTERVERTEBRAL DISC: Status: ACTIVE | Noted: 2023-01-18

## 2023-10-16 PROBLEM — M54.6 THORACIC BACK PAIN: Status: ACTIVE | Noted: 2023-02-17

## 2023-10-16 PROBLEM — M79.7 FIBROMYALGIA: Status: ACTIVE | Noted: 2023-01-18

## 2023-10-16 PROBLEM — E11.9 DIABETES: Status: ACTIVE | Noted: 2023-10-16

## 2023-10-16 PROBLEM — M89.8X1 CLAVICLE PAIN: Status: ACTIVE | Noted: 2023-03-21

## 2023-10-16 PROBLEM — M54.16 LUMBAR RADICULOPATHY: Status: ACTIVE | Noted: 2023-01-18

## 2023-10-16 PROBLEM — M51.36 DEGENERATION OF LUMBAR INTERVERTEBRAL DISC: Status: ACTIVE | Noted: 2023-01-18

## 2023-10-16 PROCEDURE — G0123 SCREEN CERV/VAG THIN LAYER: HCPCS

## 2023-10-16 RX ORDER — CELECOXIB 100 MG/1
100 CAPSULE ORAL 2 TIMES DAILY
COMMUNITY

## 2023-10-16 RX ORDER — EPINEPHRINE 0.3 MG/.3ML
INJECTION SUBCUTANEOUS
COMMUNITY
Start: 2023-08-14

## 2023-10-16 RX ORDER — FLUTICASONE PROPIONATE 50 MCG
1 SPRAY, SUSPENSION (ML) NASAL DAILY
COMMUNITY

## 2023-10-16 RX ORDER — BUSPIRONE HYDROCHLORIDE 30 MG/1
1 TABLET ORAL EVERY 12 HOURS SCHEDULED
COMMUNITY
Start: 2023-09-02

## 2023-10-16 RX ORDER — DUPILUMAB 300 MG/2ML
INJECTION, SOLUTION SUBCUTANEOUS
COMMUNITY
Start: 2023-10-05

## 2023-10-16 RX ORDER — ALBUTEROL SULFATE 90 UG/1
2 AEROSOL, METERED RESPIRATORY (INHALATION)
COMMUNITY

## 2023-10-16 RX ORDER — CLOBETASOL PROPIONATE 0.5 MG/G
OINTMENT TOPICAL
COMMUNITY
Start: 2023-09-13

## 2023-10-16 NOTE — ASSESSMENT & PLAN NOTE
Symptoms are improving since dietary changes related to alpha gal.  I would recommend the patient discuss the fecal incontinence with her primary care provider.  She may need colorectal evaluation.

## 2023-10-16 NOTE — PROGRESS NOTES
"Well Woman Visit    CC: YOUNG     HPI:   58 y.o. who presents for a well woman exam. Has several concerns. No menses in many years. Complains of \"feeling pregnant\" due to bloating. Has both urinary and fecal incontinence. Can have multiple episodes of urinary incontinence. Fecal incontinence is rare and less of a problem since diagnosed with john gal and changing her diet. The bloating sensation has also improved with her dietary changed    History: PMHx, Meds, Allergies, PSHx, Social Hx, and POBHx all reviewed and updated.    /81   Pulse 101   Ht 142.2 cm (55.98\")   Wt 92.5 kg (204 lb)   Breastfeeding No   BMI 45.77 kg/m²     Physical Exam  Vitals and nursing note reviewed. Exam conducted with a chaperone present.   Constitutional:       General: She is not in acute distress.     Appearance: Normal appearance. She is obese. She is not ill-appearing.   HENT:      Head: Normocephalic and atraumatic.      Mouth/Throat:      Mouth: Mucous membranes are moist.      Pharynx: Oropharynx is clear.   Eyes:      Extraocular Movements: Extraocular movements intact.   Neck:      Thyroid: No thyroid mass or thyromegaly.   Cardiovascular:      Rate and Rhythm: Regular rhythm.      Heart sounds: No murmur heard.  Pulmonary:      Effort: Pulmonary effort is normal.      Breath sounds: No wheezing.   Chest:   Breasts:     Breasts are symmetrical.      Right: Normal. No swelling, bleeding, inverted nipple, mass, nipple discharge, skin change or tenderness.      Left: Normal. No swelling, bleeding, inverted nipple, mass, nipple discharge, skin change or tenderness.   Abdominal:      General: Abdomen is flat. There is no distension.      Palpations: Abdomen is soft. There is no mass.      Tenderness: There is no abdominal tenderness.      Hernia: There is no hernia in the left inguinal area or right inguinal area.   Genitourinary:     General: Normal vulva.      Exam position: Lithotomy position.      Pubic Area: No " rash.       Labia:         Right: No rash, tenderness, lesion or injury.         Left: No rash, tenderness, lesion or injury.       Urethra: No prolapse, urethral pain or urethral lesion.      Vagina: Normal. No vaginal discharge, erythema, tenderness, bleeding or prolapsed vaginal walls.      Cervix: Normal. No cervical motion tenderness, discharge, friability, lesion, erythema or cervical bleeding.      Uterus: Normal. Not deviated, not enlarged, not fixed and not tender.       Adnexa:         Right: No mass or tenderness.          Left: No mass or tenderness.        Comments: The patient's exam is limited by habitus  Musculoskeletal:         General: No swelling.      Right lower leg: No edema.      Left lower leg: No edema.   Lymphadenopathy:      Upper Body:      Right upper body: No supraclavicular or axillary adenopathy.      Left upper body: No supraclavicular or axillary adenopathy.   Skin:     General: Skin is warm and dry.      Findings: Rash present. No lesion.      Comments: There is an erythematous papular rash covering the lower portion of the lower extremity bilaterally.  The rash appears most consistent with insect bites.   Neurological:      Mental Status: She is alert and oriented to person, place, and time.   Psychiatric:         Mood and Affect: Mood normal.         Behavior: Behavior normal.         Thought Content: Thought content normal.         ASSESSMENT AND PLAN:    Diagnoses and all orders for this visit:    1. Well woman exam (Primary)  Assessment & Plan:  Pap  Mammogram  Recommend daily calcium and vitamin D    Orders:  -     IGP,rfx Aptima HPV All Pth  -     Mammo Screening Digital Tomosynthesis Bilateral With CAD; Future    2. Urinary incontinence, mixed  Assessment & Plan:  The patient is already being evaluated by urology      3. Full incontinence of feces  Assessment & Plan:  Symptoms are improving since dietary changes related to alpha gal.  I would recommend the patient discuss  the fecal incontinence with her primary care provider.  She may need colorectal evaluation.      4. Morbid obesity with BMI of 45.0-49.9, adult  Assessment & Plan:  Discussed exercise, nutrition and recommended weight loss.          Preventative:   MMG  Recommend FLU vaccine this season, R/B discussed  s/p COVID vaccine  COVID booster recommended    She understands the importance of having any ordered tests to be performed in a timely fashion.  The risks of not performing them include, but are not limited to, advanced cancer stages, bone loss from osteoporosis and/or subsequent increase in morbidity and/or mortality.  She is encouraged to review her results online and/or contact or office if she has questions.     Follow Up:  Return for Annual physical.    Esau Whitlock MD  10/16/2023

## 2023-10-20 LAB
CONV .: NORMAL
CYTOLOGIST CVX/VAG CYTO: NORMAL
CYTOLOGY CVX/VAG DOC CYTO: NORMAL
CYTOLOGY CVX/VAG DOC THIN PREP: NORMAL
DX ICD CODE: NORMAL
HIV 1 & 2 AB SER-IMP: NORMAL
OTHER STN SPEC: NORMAL
STAT OF ADQ CVX/VAG CYTO-IMP: NORMAL

## 2023-11-06 ENCOUNTER — TELEPHONE (OUTPATIENT)
Dept: OBSTETRICS AND GYNECOLOGY | Facility: CLINIC | Age: 58
End: 2023-11-06
Payer: COMMERCIAL

## 2023-11-06 NOTE — TELEPHONE ENCOUNTER
Caller: ALEXANDER    Relationship: Provider    Best call back number: [phone]      Who are you requesting to speak with (clinical staff, DR. KHALIL        What was the call regarding: ALEXANDER WITH [email or web address] HAS AN APPT FOR OUR PATIENT, FOR A 3 D MAMMOGRAM  11/14/23 @ 1PM CENTRAL TIME

## 2023-11-14 ENCOUNTER — OFFICE VISIT (OUTPATIENT)
Dept: SLEEP MEDICINE | Facility: HOSPITAL | Age: 58
End: 2023-11-14
Payer: COMMERCIAL

## 2023-11-14 ENCOUNTER — HOSPITAL ENCOUNTER (OUTPATIENT)
Dept: OTHER | Facility: HOSPITAL | Age: 58
Discharge: HOME | End: 2023-11-14

## 2023-11-14 VITALS
BODY MASS INDEX: 45.44 KG/M2 | OXYGEN SATURATION: 97 % | WEIGHT: 202 LBS | HEART RATE: 75 BPM | DIASTOLIC BLOOD PRESSURE: 77 MMHG | HEIGHT: 56 IN | SYSTOLIC BLOOD PRESSURE: 135 MMHG

## 2023-11-14 DIAGNOSIS — R29.818 SUSPECTED SLEEP APNEA: Primary | ICD-10-CM

## 2023-11-14 DIAGNOSIS — R06.83 SNORING: ICD-10-CM

## 2023-11-14 DIAGNOSIS — E66.9 OBESITY, UNSPECIFIED CLASSIFICATION, UNSPECIFIED OBESITY TYPE, UNSPECIFIED WHETHER SERIOUS COMORBIDITY PRESENT: ICD-10-CM

## 2023-11-14 PROCEDURE — G0463 HOSPITAL OUTPT CLINIC VISIT: HCPCS

## 2023-11-14 RX ORDER — OMEPRAZOLE 20 MG/1
1 CAPSULE, DELAYED RELEASE ORAL DAILY
COMMUNITY

## 2023-11-14 RX ORDER — HYDROCODONE BITARTRATE AND ACETAMINOPHEN 5; 325 MG/1; MG/1
TABLET ORAL
COMMUNITY
End: 2023-11-14 | Stop reason: ALTCHOICE

## 2023-11-14 RX ORDER — PEN NEEDLE, DIABETIC 31 GX5/16"
NEEDLE, DISPOSABLE MISCELLANEOUS
COMMUNITY

## 2023-11-14 RX ORDER — METFORMIN HYDROCHLORIDE 500 MG/1
1 TABLET, EXTENDED RELEASE ORAL DAILY
COMMUNITY

## 2023-11-14 RX ORDER — ESCITALOPRAM OXALATE 10 MG/1
1 TABLET ORAL DAILY
COMMUNITY

## 2023-11-14 RX ORDER — TRIAMCINOLONE ACETONIDE 1 MG/G
CREAM TOPICAL
COMMUNITY

## 2023-11-14 RX ORDER — ALBUTEROL SULFATE 90 UG/1
2 AEROSOL, METERED RESPIRATORY (INHALATION) EVERY 4 HOURS PRN
COMMUNITY

## 2023-11-14 RX ORDER — IBUPROFEN 800 MG/1
1 TABLET ORAL 3 TIMES DAILY PRN
COMMUNITY

## 2023-11-14 RX ORDER — CYCLOBENZAPRINE HCL 5 MG
TABLET ORAL
COMMUNITY

## 2023-11-14 RX ORDER — FENOFIBRATE 145 MG/1
1 TABLET, COATED ORAL DAILY
COMMUNITY

## 2023-11-14 RX ORDER — LORATADINE 10 MG
TABLET ORAL
COMMUNITY
Start: 2023-10-31

## 2023-11-14 RX ORDER — FLUOXETINE 20 MG/1
1 TABLET, FILM COATED ORAL DAILY
COMMUNITY

## 2023-11-14 NOTE — PROGRESS NOTES
[facility] [address] [address]  Phone: [phone]  Fax: [phone]      Anyi Justice  [id number]   1965  58 y.o.  female      Referring Provider: Dr. José Luis Smith  PCP: Batsheva Mckeon APRN    Type of service: Initial Sleep Medicine Consult.  Date of service: 11/14/2023          CHIEF COMPLAINT: Suspected sleep apnea      HISTORY OF PRESENT ILLNESS:  Thank you for asking us to see Anyi Justice.  Anyi Justice 58 y.o. was seen today on 11/14/2023 at [facility].  Patient presents today with symptoms of snoring, non-restorative sleep, and suspected sleep apnea.  The symptoms are chronic and persistent in nature.  Patient has no history of  tonsillectomy, adenoidectomy, nasal surgery, UPPP.  She reports she has always had an irregular sleep schedule.  Follows with pain management and is on gabapentin and baclofen.  Also with history of allergies and    Watches 2 grandsons while daughter-in-law in nursing school or working in nursing home.  Lives in [place], [place].          SLEEP HISTORY:  Sleep schedule:  Bedtime: Varies, usually 10 PM to midnight  Wake time: 4 AM to watch grandchildren, 4 to 6 AM on weekends then back to bed  Time it takes to fall asleep: Varies  Average hours of sleep: 4 to 8 hours  Number of naps per day: Not napping daily    Symptoms:   In addition to the above, patient reports the following associated symptoms:  Have you ever awakened gasping for breath, coughing, choking: No   Change in weight: Yes, fluctuates  Morning headaches:  Yes   Awaken with a sore throat or dry mouth:  Yes   Leg jerking at night:  Yes   Creepy crawly feeling in legs/urge to move legs: No   Teeth grinding: Yes  Have you ever awakened at night with a sour taste or burning sensation in your chest:  No   Do you have muscle weakness with laughing or anger:  No   Have you ever felt paralyzed while  "going to sleep or waking up:  No   Sleepwalking: No   Nightmares: No   Nocturia (urination at night): 2+ times per night  Memory Problem: No     Medical Conditions (PMH):   Type 2 diabetes  Anxiety, depression, PTSD  Thoracic back pain  Lumbar radiculopathy  Fibromyalgia  Alpha gal  Prurigo nodularis    Social history:  Do you drive a commercial vehicle:  No   Shift work:  No   Tobacco use:  No   Alcohol use: 0 per week  Caffeinated drinks: >3 per day, none after 6pm--  recommended trying to avoid afternoon/ evening caffeine, trying to cut back on intake.    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, paints    Family History (parents and siblings) (pertaining to sleep medicine):  Restless legs  Obesity  Sleep apnea--- mother and aunt    Medications: reviewed    Allergies:  Alpha-gal, Latex, and Wheat      REVIEW OF SYSTEMS:  Pertinent positive symptoms are:  Snoring  [place] Sleepiness Scale of   11  Fatigue  Frequent urination  Bedwetting  TMJ issues  Rash  Dyspnea  Anxiety  Depression  Dizziness      PHYSICAL EXAM:  CONSTITUTINONAL:   Vitals:    11/14/23 1100   BP: 135/77   Pulse: 75   SpO2: 97%   Weight: 91.6 kg (202 lb)   Height: 142.2 cm (55.98\")    Body mass index is 45.31 kg/m².   HEAD: atraumatic, normocephalic   THROAT: Mallampati class IV  NECK: Neck Circumference: 15 inches, trachea is midline  RESPIRATORY SYSTEM: Respirations even, unlabored, normal rate  CARDIOVASULAR SYSTEM: Normal rate  NEUROLOGICAL SYSTEM: Alert and oriented x 3, normal gait  PSYCHIATRIC SYSTEM: Mood is normal/ appropriate     Office note(s) from care team reviewed. Office note(s) reviewed: 6/19/2023 neurology note    Labs/ Test Results Reviewed:     2021 A1c            ASSESSMENT AND PLAN:   Suspected sleep apnea: patient's symptoms and physical examination are concerning for possible sleep apnea (G47.30).   I discussed the signs, symptoms, and pathophysiology of sleep apnea with this patient.  I also discussed the potential " complications of untreated sleep apnea including but not limited to resistant hypertension, insulin resistance, pulmonary hypertension, atrial fibrillation, stroke, nonrestorative sleep with hypersomnia which can increase risk for motor vehicle accidents, etc.   Different testing methods including home-based and lab based sleep studies were discussed with this patient.   Based on patient history and physical examination, will proceed with home sleep study, per patient preference.  The order for the sleep study is placed in [facility].  The test will be scheduled after prior authorization has been obtained through patient's insurance.  Treatment and management will be discussed in more detail with this patient after the test is completed.  All questions were answered to patient's satisfaction.  Patient would be amenable to considering in lab polysomnogram if unable to make diagnosis from home study.  Snoring (R06.83): snoring is the sound created by turbulent airflow vibrating upper airway soft tissue.  I have also discussed factors affecting snoring including sleep deprivation, sleeping on the back and alcohol ingestion. To minimize snoring, patient is advised to have adequate sleep, sleep on their side, and avoid alcohol and sedative medications around bedtime.   Excessive daytime sleepiness: [place] Sleepiness Scale of  11.  There are many causes of excessive daytime sleepiness including but not limited to sleep apnea, shiftwork syndrome, depression, and other medical disorders such as heart disease, kidney disease, and liver failure.  The most serious cause of excessive sleepiness is due to neurological conditions such as narcolepsy/cataplexy.  More commonly excessive sleepiness is caused by sleep apnea with frequent awakenings during sleep time.  Patient was advised not to drive, operate heavy machinery, or do activities that require high concentration if feeling tired/drowsy.  Obesity: Body mass index is 45.31  kg/m².. Patients who are overweight or obese are at increased risk of sleep apnea/ sleep disordered breathing. Weight reduction and healthy lifestyle are encouraged in overweight/ obese patients as part of a comprehensive approach to sleep apnea treatment.    Irregular sleep schedule: discussed good sleep hygiene.  Handout provided.  Fibromyalgia  Alpha Gal   Prurigo Nodularis: on dupixent per outside provider  PTSD, anxiety, depression, ADHD: sees communicare counseling/ psychiatry.  Denies SI/ HI.   Chronic back pain: seeing pain management, to get steroid shot, on gabapentin    I have also discussed with the patient the following  Sleep hygiene: try to maintain a regular bed time and wake time, avoid watching TV/ using electronic devices in bed (including cell phones), limit caffeinated and alcoholic beverages before bed, try to maintain a cool and quiet sleep environment, avoid daytime naps  Adequate amount of sleep: most people need around 7 to 8 hours of sleep each night        Patient will follow-up after study, 31 to 90 days after PAP therapy initiated if applicable, or contact the office sooner for questions or concerns. Patient's questions were answered.            Thank you again for asking me to consult on this patient.  Please do not hesitate to call me if you have additional questions or concerns.       Nimco Marie DNP, APRN  [facility]

## 2023-11-30 ENCOUNTER — HOSPITAL ENCOUNTER (OUTPATIENT)
Dept: SLEEP MEDICINE | Facility: HOSPITAL | Age: 58
Discharge: HOME | End: 2023-11-30
Admitting: NURSE PRACTITIONER
Payer: COMMERCIAL

## 2023-11-30 DIAGNOSIS — R29.818 SUSPECTED SLEEP APNEA: ICD-10-CM

## 2023-11-30 DIAGNOSIS — E66.9 OBESITY, UNSPECIFIED CLASSIFICATION, UNSPECIFIED OBESITY TYPE, UNSPECIFIED WHETHER SERIOUS COMORBIDITY PRESENT: ICD-10-CM

## 2023-11-30 DIAGNOSIS — R06.83 SNORING: ICD-10-CM

## 2023-11-30 PROCEDURE — 95806 SLEEP STUDY UNATT&RESP EFFT: CPT

## 2023-12-06 DIAGNOSIS — G47.33 OSA (OBSTRUCTIVE SLEEP APNEA): Primary | ICD-10-CM

## 2023-12-06 DIAGNOSIS — R06.83 SNORING: ICD-10-CM

## 2024-01-04 ENCOUNTER — TELEPHONE (OUTPATIENT)
Dept: SLEEP MEDICINE | Facility: HOSPITAL | Age: 59
End: 2024-01-04
Payer: COMMERCIAL

## 2024-01-11 ENCOUNTER — OFFICE VISIT (OUTPATIENT)
Dept: NEUROLOGY | Facility: CLINIC | Age: 59
End: 2024-01-11
Payer: COMMERCIAL

## 2024-01-11 VITALS
BODY MASS INDEX: 44.76 KG/M2 | HEART RATE: 74 BPM | DIASTOLIC BLOOD PRESSURE: 75 MMHG | SYSTOLIC BLOOD PRESSURE: 114 MMHG | HEIGHT: 56 IN | WEIGHT: 199 LBS

## 2024-01-11 DIAGNOSIS — E08.42 DIABETIC POLYNEUROPATHY ASSOCIATED WITH DIABETES MELLITUS DUE TO UNDERLYING CONDITION: Primary | ICD-10-CM

## 2024-01-11 NOTE — PROGRESS NOTES
"Chief Complaint  Follow-up (Sleep study completed)    Subjective          Anyi Justice is a 58 y.o. female who presents to [facility] NEUROLOGY & NEUROSURGERY  History of Present Illness  58-year-old woman here for follow-up of her gait abnormalities.  She states that she has seen sleep medicine and she has obstructive sleep apnea.  She continues to have unsteadiness.  She is diabetic.  She states that she has to hold onto objects as well as use a cane for balance.    Objective   Vital Signs:   /75 (BP Location: Right arm, Patient Position: Sitting, Cuff Size: Adult)   Pulse 74   Ht 142.4 cm (56.06\")   Wt 90.3 kg (199 lb)   BMI 44.51 kg/m²     Physical Exam   Alert, fluent, phasic, follows commands well.  There is no weakness of the upper or lower extremities.  Station gait she walks with a wide-based gait and is more steady when she is being held onto it and is able to tiptoe and heel walk.        Assessment and Plan  There are no diagnoses linked to this encounter.     Total time spent with the patient and coordinating patient care was 25 minutes.    Follow Up  No follow-ups on file.  Patient was given instructions and counseling regarding her condition or for health maintenance advice. Please see specific information pulled into the AVS if appropriate.       "

## 2024-01-11 NOTE — ASSESSMENT & PLAN NOTE
I discussed with her that her diabetes is causing her to have diabetic neuropathy and it is causing her to be unsteady.  I discussed with her that the treatment is to treat the underlying problem which is the diabetes and to continue using supportive measures such as canes to help her with her unsteadiness.  She is to follow-up with the sleep medicine as well.  Thank for let me participate in her care

## 2024-03-27 ENCOUNTER — OFFICE VISIT (OUTPATIENT)
Dept: OBSTETRICS AND GYNECOLOGY | Facility: CLINIC | Age: 59
End: 2024-03-27
Payer: COMMERCIAL

## 2024-03-27 VITALS
BODY MASS INDEX: 45.89 KG/M2 | DIASTOLIC BLOOD PRESSURE: 71 MMHG | WEIGHT: 204 LBS | SYSTOLIC BLOOD PRESSURE: 112 MMHG | HEART RATE: 70 BPM | HEIGHT: 56 IN

## 2024-03-27 DIAGNOSIS — R15.9 FULL INCONTINENCE OF FECES: ICD-10-CM

## 2024-03-27 DIAGNOSIS — N39.46 URINARY INCONTINENCE, MIXED: Primary | ICD-10-CM

## 2024-03-27 DIAGNOSIS — R10.2 PELVIC PRESSURE IN FEMALE: ICD-10-CM

## 2024-03-27 PROCEDURE — 99214 OFFICE O/P EST MOD 30 MIN: CPT | Performed by: STUDENT IN AN ORGANIZED HEALTH CARE EDUCATION/TRAINING PROGRAM

## 2024-03-27 RX ORDER — AMANTADINE HYDROCHLORIDE 100 MG/1
100 TABLET ORAL
COMMUNITY
Start: 2024-02-27

## 2024-03-27 NOTE — PROGRESS NOTES
GYN Problem Visit - Abnormal uterine bleeding    Chief Complaint   Patient presents with    Fibroids     Pelvic pain, incontinence, pressure, night sweats/hot flashes             HPI  Anyi Justice is a 58 y.o. female, , who presents for evaluation for pelvic pressure as well as urinary and fecal incontinence.   Patient is menopausal and denies any vaginal bleeding.  She is not sexually active.  Denies any vaginal discharge or vaginal irritation.  Patient has been experiencing pelvic pressure and reports that she was informed that she has a fibroid uterus.  Additionally patient has lower back issues as well as a torn meniscus in her right knee.  She is on several pain regiments for these ailments and this also causes her abdominal discomfort.  She reports that she has been seen by urology for incontinence however is not on current any medications and endorses stress incontinence.  Patient was last seen with Dr. Whitlock in 2023 in which a Pap smear resulted normal.  He had recommended an evaluation by colorectal for fecal incontinence however she reports improvement in symptoms with change in diet secondary to alpha gal.  She denies any burning with urination, or fevers or chills.       Additional OB/GYN History   No LMP recorded. Patient is postmenopausal.  Allergies : Alpha-gal, Latex, and Wheat     The additional following portions of the patient's history were reviewed and updated as appropriate: allergies, current medications, past family history, past medical history, past social history, past surgical history, and problem list.    Review of Systems   Constitutional:  Negative for fatigue and fever.   Respiratory:  Negative for cough and shortness of breath.    Cardiovascular:  Negative for chest pain.   Gastrointestinal:  Negative for abdominal distention and abdominal pain.   Genitourinary:  Positive for amenorrhea, frequency, pelvic pressure, urgency and urinary incontinence. Negative for  "difficulty urinating, dysuria, vaginal bleeding, vaginal discharge and vaginal pain.       I have reviewed and agree with the HPI, ROS, and historical information as entered above. Scott Davison MD    Objective   /71   Pulse 70   Ht 142.4 cm (56.06\")   Wt 92.5 kg (204 lb)   Breastfeeding No   BMI 45.64 kg/m²     Physical Exam  Vitals and nursing note reviewed. Exam conducted with a chaperone present.   Constitutional:       General: She is not in acute distress.     Appearance: Normal appearance. She is obese. She is not toxic-appearing.   HENT:      Head: Normocephalic and atraumatic.   Eyes:      Extraocular Movements: Extraocular movements intact.      Conjunctiva/sclera: Conjunctivae normal.   Cardiovascular:      Pulses: Normal pulses.   Pulmonary:      Effort: Pulmonary effort is normal.   Abdominal:      General: There is no distension.      Palpations: Abdomen is soft.      Tenderness: There is no abdominal tenderness.   Genitourinary:     General: Normal vulva.      Exam position: Lithotomy position.      Labia:         Right: No tenderness, lesion or injury.         Left: No tenderness, lesion or injury.       Vagina: Normal.      Cervix: Normal.      Adnexa:         Right: No mass, tenderness or fullness.          Left: No mass, tenderness or fullness.        Comments: Unable to assess uterus secondary to body habitus, recommendation for transvaginal ultrasound to assess for structural abnormalities.  Musculoskeletal:      Cervical back: Normal range of motion.   Skin:     General: Skin is warm and dry.   Neurological:      Mental Status: She is alert and oriented to person, place, and time.   Psychiatric:         Mood and Affect: Mood normal.         Behavior: Behavior normal.         Thought Content: Thought content normal.            Assessment and Plan  Anyi Justice is a 58 y.o.  presenting for concerns of pelvic heaviness and urinary and fecal incontinence.  Urinalysis will be " performed to rule out urinary tract infection.  Patient was given bladder diary to assess symptoms.  Recommendation for urogynecology evaluation.  Additionally transvaginal ultrasound will be ordered to assess for fibroid uterus that could be causing patient's discomfort as well.  She has additional risk factors for chronic pain including lower back pain and lower extremity pain.  She is in pain clinic.  Return to office in 8 weeks for follow-up    Diagnoses and all orders for this visit:    1. Urinary incontinence, mixed (Primary)  -     Ambulatory Referral to Gynecologic Urology  -     Urinalysis With Culture If Indicated -    2. Full incontinence of feces    3. Pelvic pressure in female  -     US Non-ob Transvaginal; Future          She understands the importance of having the above orders performed in a timely fashion.  The risks of not performing them include, but are not limited to, cancer and/or subsequent increase in morbidity and/or mortality.  She is encouraged to review her results online and/or contact or office if she has questions.     Follow Up:  Return in about 8 weeks (around 5/22/2024), or if symptoms worsen or fail to improve.        Scott Davison MD  03/27/2024

## 2024-03-29 ENCOUNTER — TELEPHONE (OUTPATIENT)
Dept: OBSTETRICS AND GYNECOLOGY | Facility: CLINIC | Age: 59
End: 2024-03-29
Payer: COMMERCIAL

## 2024-05-22 ENCOUNTER — OFFICE VISIT (OUTPATIENT)
Dept: OBSTETRICS AND GYNECOLOGY | Facility: CLINIC | Age: 59
End: 2024-05-22
Payer: COMMERCIAL

## 2024-05-22 VITALS
HEART RATE: 79 BPM | HEIGHT: 56 IN | WEIGHT: 198 LBS | BODY MASS INDEX: 44.54 KG/M2 | DIASTOLIC BLOOD PRESSURE: 71 MMHG | SYSTOLIC BLOOD PRESSURE: 108 MMHG

## 2024-05-22 DIAGNOSIS — N39.46 URINARY INCONTINENCE, MIXED: ICD-10-CM

## 2024-05-22 DIAGNOSIS — R10.2 PELVIC PRESSURE IN FEMALE: Primary | ICD-10-CM

## 2024-05-22 DIAGNOSIS — R15.9 FULL INCONTINENCE OF FECES: ICD-10-CM

## 2024-05-22 PROCEDURE — 99212 OFFICE O/P EST SF 10 MIN: CPT | Performed by: STUDENT IN AN ORGANIZED HEALTH CARE EDUCATION/TRAINING PROGRAM

## 2024-05-22 RX ORDER — LEVOTHYROXINE SODIUM 0.05 MG/1
50 TABLET ORAL
COMMUNITY

## 2024-05-22 RX ORDER — DOXYCYCLINE HYCLATE 100 MG
100 TABLET ORAL
COMMUNITY
Start: 2024-05-04

## 2024-05-22 RX ORDER — CHOLECALCIFEROL TAB 125 MCG (5000 UNIT) 125 MCG (5000 UT)
1 TAB DAILY
COMMUNITY

## 2024-05-22 NOTE — PROGRESS NOTES
"GYN Problem Visit -     Chief Complaint   Patient presents with    FUUS Pelvic Pressure      US done 24               HPI  Anyi Justice is a 59 y.o. female, , who presents for follow-up for ultrasound.  Patient reports has follow-up for urinary and fecal incontinence with Dr. Torrez on .  Patient noting discomfort with positional changes.  This appears to be more on the left side.  No vaginal bleeding.  No new complaints.      Additional OB/GYN History   No LMP recorded. Patient is postmenopausal.  Allergies : Alpha-gal, Latex, and Wheat     The additional following portions of the patient's history were reviewed and updated as appropriate: allergies, current medications, past family history, past medical history, past social history, past surgical history, and problem list.    Review of Systems   Constitutional:  Negative for fatigue and fever.   Respiratory:  Negative for cough and shortness of breath.    Cardiovascular:  Negative for chest pain.   Gastrointestinal:  Positive for abdominal pain. Negative for abdominal distention.   Genitourinary:  Negative for difficulty urinating and dysuria.       I have reviewed and agree with the HPI, ROS, and historical information as entered above. Scott Davison MD    Objective   /71   Pulse 79   Ht 142.4 cm (56.06\")   Wt 89.8 kg (198 lb)   Breastfeeding No   BMI 44.30 kg/m²     Physical Exam  Vitals and nursing note reviewed.   Constitutional:       General: She is not in acute distress.     Appearance: Normal appearance. She is not toxic-appearing.   HENT:      Head: Normocephalic and atraumatic.   Eyes:      Extraocular Movements: Extraocular movements intact.      Conjunctiva/sclera: Conjunctivae normal.   Cardiovascular:      Pulses: Normal pulses.   Pulmonary:      Effort: Pulmonary effort is normal.   Abdominal:      General: There is no distension.      Palpations: Abdomen is soft.      Tenderness: There is no abdominal tenderness. "   Genitourinary:     Comments: deferred  Musculoskeletal:      Cervical back: Normal range of motion.   Skin:     General: Skin is warm and dry.   Neurological:      Mental Status: She is alert and oriented to person, place, and time.   Psychiatric:         Mood and Affect: Mood normal.         Behavior: Behavior normal.         Thought Content: Thought content normal.            Assessment and Plan  Anyi Justice is a 59 y.o.  presenting for follow-up on ultrasound performed in April.  Ultrasound unremarkable thin endometrial stripe.  No structural abnormalities observed.  Patient to continue follow-up for urinary fecal incontinence with Dr. Kendell Torrez on .  Discussed with patient other causes of abdominal discomfort including comorbidities such as diabetes and medications.  Patient will continue diet and exercise and follow-up with primary care physician.  Pap smear up-to-date as of 2023.  Patient gets annual mammograms at [facility].  Recommendation for follow-up Pap smear in .  Recommendation for continuing annual mammogram.  Return to office as need be      Diagnoses and all orders for this visit:    1. Pelvic pressure in female (Primary)    2. Urinary incontinence, mixed    3. Full incontinence of feces        Counseling:  Postmenopausal bleeding precautions      She understands the importance of having the above orders performed in a timely fashion.  The risks of not performing them include, but are not limited to, cancer and/or subsequent increase in morbidity and/or mortality.  She is encouraged to review her results online and/or contact or office if she has questions.     Follow Up:  Return if symptoms worsen or fail to improve.      Scott Davison MD  2024

## 2024-09-12 ENCOUNTER — OFFICE VISIT (OUTPATIENT)
Dept: NEUROSURGERY | Facility: CLINIC | Age: 59
End: 2024-09-12
Payer: COMMERCIAL

## 2024-09-12 VITALS
DIASTOLIC BLOOD PRESSURE: 78 MMHG | SYSTOLIC BLOOD PRESSURE: 129 MMHG | BODY MASS INDEX: 46.68 KG/M2 | HEIGHT: 56 IN | WEIGHT: 207.5 LBS

## 2024-09-12 DIAGNOSIS — M54.41 CHRONIC BILATERAL LOW BACK PAIN WITH RIGHT-SIDED SCIATICA: Primary | ICD-10-CM

## 2024-09-12 DIAGNOSIS — G89.29 CHRONIC BILATERAL LOW BACK PAIN WITH RIGHT-SIDED SCIATICA: Primary | ICD-10-CM

## 2024-09-12 PROCEDURE — 99213 OFFICE O/P EST LOW 20 MIN: CPT | Performed by: NEUROLOGICAL SURGERY

## 2024-09-12 RX ORDER — PHENOL 1.4 %
600 AEROSOL, SPRAY (ML) MUCOUS MEMBRANE DAILY
COMMUNITY

## 2024-09-12 NOTE — PROGRESS NOTES
Anyi Justice is a 59 y.o. female that presents with Back Pain       She has had increased lower back and bilateral leg pain. The right leg is the worst pain. She has some intermittent numbness. She has some bilateral hand numbness worse with driving.     Review of Systems   Musculoskeletal:  Positive for arthralgias and back pain.        Vitals:    09/12/24 1455   BP: 129/78        Physical Exam  Constitutional:       Comments: BMI 46   Cardiovascular:      Comments: No notable edema   Pulmonary:      Effort: Pulmonary effort is normal.   Neurological:      Mental Status: She is alert.      Sensory: No sensory deficit.      Motor: Weakness (right EHL 4/5) present.      Deep Tendon Reflexes: Reflexes normal (2/4 in BLE).   Psychiatric:         Mood and Affect: Mood normal.        She does not have the imaging available, but the report indicates worsening facet disease and right L4-5 foraminal narrowing.      Assessment and Plan {CC Problem List  Visit Diagnosis  ROS  Review (Popup)  Health Maintenance  Quality  BestPractice  Medications  SmartSets  SnapShot Encounters  Media :23}   Problem List Items Addressed This Visit    None  Visit Diagnoses       Chronic bilateral low back pain with right-sided sciatica    -  Primary        She will send her images for my review after which I will make further recommendations.     Follow Up {Instructions Charge Capture  Follow-up Communications :23}

## 2024-10-08 ENCOUNTER — TELEPHONE (OUTPATIENT)
Dept: OBSTETRICS AND GYNECOLOGY | Facility: CLINIC | Age: 59
End: 2024-10-08

## 2024-10-08 ENCOUNTER — TRANSCRIBE ORDERS (OUTPATIENT)
Dept: ADMINISTRATIVE | Facility: HOSPITAL | Age: 59
End: 2024-10-08
Payer: MEDICARE

## 2024-10-08 DIAGNOSIS — Z12.31 VISIT FOR SCREENING MAMMOGRAM: Primary | ICD-10-CM

## 2024-10-08 NOTE — TELEPHONE ENCOUNTER
Left a message for the patient trying to find out where she wants to have her mammogram done. If she wants to go to [facility] we will need to have an order placed to fax. If she wants to go to [facility] then she can just call [phone] option 3 to schedule. HUB TO RELAY

## 2024-10-08 NOTE — TELEPHONE ENCOUNTER
Name: Anyi Justice    Relationship: Self    Best Callback Number: [phone]    HUB PROVIDED THE RELAY MESSAGE FROM THE OFFICE   PATIENT VOICED UNDERSTANDING AND HAS NO FURTHER QUESTIONS AT THIS TIME    ADDITIONAL INFORMATION: PT CALLING [facility] TO SCHEDULE

## 2024-10-08 NOTE — TELEPHONE ENCOUNTER
Caller: Anyi Justice    Relationship: Self    Best call back number: [phone]      Who are you requesting to speak with (clinical staff,DR. KHALIL      What was the call regarding: PATIENT ADVISED THAT SHE WOULD LIKE TO SCHEDULE A MAMMOGRAM AT [facility] OR IN [facility]. PLEASE ASSIST.

## 2024-12-03 ENCOUNTER — HOSPITAL ENCOUNTER (OUTPATIENT)
Dept: MAMMOGRAPHY | Facility: HOSPITAL | Age: 59
Discharge: HOME | End: 2024-12-03
Admitting: OBSTETRICS & GYNECOLOGY
Payer: MEDICARE

## 2024-12-03 DIAGNOSIS — Z12.31 VISIT FOR SCREENING MAMMOGRAM: ICD-10-CM

## 2024-12-03 PROCEDURE — 77067 SCR MAMMO BI INCL CAD: CPT

## 2024-12-03 PROCEDURE — 77063 BREAST TOMOSYNTHESIS BI: CPT
